# Patient Record
Sex: FEMALE | Race: WHITE
[De-identification: names, ages, dates, MRNs, and addresses within clinical notes are randomized per-mention and may not be internally consistent; named-entity substitution may affect disease eponyms.]

---

## 2018-01-27 ENCOUNTER — HOSPITAL ENCOUNTER (INPATIENT)
Dept: HOSPITAL 75 - ER | Age: 53
LOS: 1 days | Discharge: HOME | DRG: 440 | End: 2018-01-28
Attending: INTERNAL MEDICINE | Admitting: INTERNAL MEDICINE
Payer: SELF-PAY

## 2018-01-27 VITALS — SYSTOLIC BLOOD PRESSURE: 182 MMHG | DIASTOLIC BLOOD PRESSURE: 86 MMHG

## 2018-01-27 VITALS — DIASTOLIC BLOOD PRESSURE: 93 MMHG | SYSTOLIC BLOOD PRESSURE: 171 MMHG

## 2018-01-27 VITALS — SYSTOLIC BLOOD PRESSURE: 176 MMHG | DIASTOLIC BLOOD PRESSURE: 81 MMHG

## 2018-01-27 VITALS — DIASTOLIC BLOOD PRESSURE: 85 MMHG | SYSTOLIC BLOOD PRESSURE: 143 MMHG

## 2018-01-27 VITALS — HEIGHT: 67 IN | BODY MASS INDEX: 24.88 KG/M2 | WEIGHT: 158.5 LBS

## 2018-01-27 DIAGNOSIS — F17.210: ICD-10-CM

## 2018-01-27 DIAGNOSIS — E11.9: ICD-10-CM

## 2018-01-27 DIAGNOSIS — E78.00: ICD-10-CM

## 2018-01-27 DIAGNOSIS — I10: ICD-10-CM

## 2018-01-27 DIAGNOSIS — K85.00: Primary | ICD-10-CM

## 2018-01-27 LAB
ALBUMIN SERPL-MCNC: 4.1 GM/DL (ref 3.2–4.5)
ALP SERPL-CCNC: 132 U/L (ref 40–136)
ALT SERPL-CCNC: 54 U/L (ref 0–55)
APTT PPP: YELLOW S
BACTERIA #/AREA URNS HPF: (no result) /HPF
BASOPHILS # BLD AUTO: 0 10^3/UL (ref 0–0.1)
BASOPHILS NFR BLD AUTO: 1 % (ref 0–10)
BILIRUB SERPL-MCNC: 0.2 MG/DL (ref 0.1–1)
BILIRUB UR QL STRIP: NEGATIVE
BUN/CREAT SERPL: 14
CALCIUM SERPL-MCNC: 9.4 MG/DL (ref 8.5–10.1)
CHLORIDE SERPL-SCNC: 104 MMOL/L (ref 98–107)
CO2 SERPL-SCNC: 23 MMOL/L (ref 21–32)
CREAT SERPL-MCNC: 0.84 MG/DL (ref 0.6–1.3)
EOSINOPHIL # BLD AUTO: 0.2 10^3/UL (ref 0–0.3)
EOSINOPHIL NFR BLD AUTO: 3 % (ref 0–10)
ERYTHROCYTE [DISTWIDTH] IN BLOOD BY AUTOMATED COUNT: 13.2 % (ref 10–14.5)
FIBRINOGEN PPP-MCNC: CLEAR MG/DL
GFR SERPLBLD BASED ON 1.73 SQ M-ARVRAT: > 60 ML/MIN
GLUCOSE SERPL-MCNC: 181 MG/DL (ref 70–105)
GLUCOSE UR STRIP-MCNC: NEGATIVE MG/DL
HCT VFR BLD CALC: 41 % (ref 35–52)
HGB BLD-MCNC: 14.3 G/DL (ref 11.5–16)
KETONES UR QL STRIP: NEGATIVE
LEUKOCYTE ESTERASE UR QL STRIP: (no result)
LIPASE SERPL-CCNC: 159 U/L (ref 8–78)
LYMPHOCYTES # BLD AUTO: 2.8 X 10^3 (ref 1–4)
LYMPHOCYTES NFR BLD AUTO: 34 % (ref 12–44)
MAGNESIUM SERPL-MCNC: 2.2 MG/DL (ref 1.8–2.4)
MANUAL DIFFERENTIAL PERFORMED BLD QL: NO
MCH RBC QN AUTO: 34 PG (ref 25–34)
MCHC RBC AUTO-ENTMCNC: 35 G/DL (ref 32–36)
MCV RBC AUTO: 98 FL (ref 80–99)
MONOCYTES # BLD AUTO: 0.7 X 10^3 (ref 0–1)
MONOCYTES NFR BLD AUTO: 9 % (ref 0–12)
NEUTROPHILS # BLD AUTO: 4.5 X 10^3 (ref 1.8–7.8)
NEUTROPHILS NFR BLD AUTO: 54 % (ref 42–75)
NITRITE UR QL STRIP: NEGATIVE
PH UR STRIP: 7 [PH] (ref 5–9)
PLATELET # BLD: 312 10^3/UL (ref 130–400)
PMV BLD AUTO: 10.8 FL (ref 7.4–10.4)
POTASSIUM SERPL-SCNC: 4.2 MMOL/L (ref 3.6–5)
PROT SERPL-MCNC: 7.5 GM/DL (ref 6.4–8.2)
PROT UR QL STRIP: NEGATIVE
RBC # BLD AUTO: 4.22 10^6/UL (ref 4.35–5.85)
RBC #/AREA URNS HPF: (no result) /HPF
SODIUM SERPL-SCNC: 139 MMOL/L (ref 135–145)
SP GR UR STRIP: 1 (ref 1.02–1.02)
TRIGL SERPL-MCNC: 314 MG/DL (ref ?–150)
UROBILINOGEN UR-MCNC: NORMAL MG/DL
WBC # BLD AUTO: 8.3 10^3/UL (ref 4.3–11)
WBC #/AREA URNS HPF: (no result) /HPF

## 2018-01-27 PROCEDURE — 36415 COLL VENOUS BLD VENIPUNCTURE: CPT

## 2018-01-27 PROCEDURE — 85025 COMPLETE CBC W/AUTO DIFF WBC: CPT

## 2018-01-27 PROCEDURE — 81000 URINALYSIS NONAUTO W/SCOPE: CPT

## 2018-01-27 PROCEDURE — 84478 ASSAY OF TRIGLYCERIDES: CPT

## 2018-01-27 PROCEDURE — 83036 HEMOGLOBIN GLYCOSYLATED A1C: CPT

## 2018-01-27 PROCEDURE — 83605 ASSAY OF LACTIC ACID: CPT

## 2018-01-27 PROCEDURE — 83690 ASSAY OF LIPASE: CPT

## 2018-01-27 PROCEDURE — 80320 DRUG SCREEN QUANTALCOHOLS: CPT

## 2018-01-27 PROCEDURE — 83735 ASSAY OF MAGNESIUM: CPT

## 2018-01-27 PROCEDURE — 80053 COMPREHEN METABOLIC PANEL: CPT

## 2018-01-27 RX ADMIN — FENTANYL CITRATE PRN MCG: 50 INJECTION, SOLUTION INTRAMUSCULAR; INTRAVENOUS at 23:53

## 2018-01-27 RX ADMIN — FENTANYL CITRATE PRN MCG: 50 INJECTION, SOLUTION INTRAMUSCULAR; INTRAVENOUS at 19:46

## 2018-01-27 RX ADMIN — SODIUM CHLORIDE AND POTASSIUM CHLORIDE SCH MLS/HR: 4.5; 1.49 INJECTION, SOLUTION INTRAVENOUS at 21:54

## 2018-01-27 RX ADMIN — FENTANYL CITRATE PRN MCG: 50 INJECTION, SOLUTION INTRAMUSCULAR; INTRAVENOUS at 21:54

## 2018-01-27 RX ADMIN — ONDANSETRON PRN MG: 2 INJECTION, SOLUTION INTRAMUSCULAR; INTRAVENOUS at 13:36

## 2018-01-27 RX ADMIN — SODIUM CHLORIDE AND POTASSIUM CHLORIDE SCH MLS/HR: 4.5; 1.49 INJECTION, SOLUTION INTRAVENOUS at 08:22

## 2018-01-27 RX ADMIN — SODIUM CHLORIDE AND POTASSIUM CHLORIDE SCH MLS/HR: 4.5; 1.49 INJECTION, SOLUTION INTRAVENOUS at 13:36

## 2018-01-27 RX ADMIN — ONDANSETRON PRN MG: 2 INJECTION, SOLUTION INTRAMUSCULAR; INTRAVENOUS at 21:54

## 2018-01-27 RX ADMIN — FENTANYL CITRATE PRN MCG: 50 INJECTION, SOLUTION INTRAMUSCULAR; INTRAVENOUS at 13:36

## 2018-01-27 RX ADMIN — ONDANSETRON PRN MG: 2 INJECTION, SOLUTION INTRAMUSCULAR; INTRAVENOUS at 08:22

## 2018-01-27 RX ADMIN — FENTANYL CITRATE PRN MCG: 50 INJECTION, SOLUTION INTRAMUSCULAR; INTRAVENOUS at 15:47

## 2018-01-27 RX ADMIN — FENTANYL CITRATE PRN MCG: 50 INJECTION, SOLUTION INTRAMUSCULAR; INTRAVENOUS at 17:39

## 2018-01-27 RX ADMIN — FENTANYL CITRATE PRN MCG: 50 INJECTION, SOLUTION INTRAMUSCULAR; INTRAVENOUS at 11:39

## 2018-01-27 RX ADMIN — FENTANYL CITRATE PRN MCG: 50 INJECTION, SOLUTION INTRAMUSCULAR; INTRAVENOUS at 09:40

## 2018-01-27 RX ADMIN — ONDANSETRON PRN MG: 2 INJECTION, SOLUTION INTRAMUSCULAR; INTRAVENOUS at 17:38

## 2018-01-27 NOTE — ED ABDOMINAL PAIN
General


Chief Complaint:  Abdominal/GI Problems


Stated Complaint:  ABD PAIN


Nursing Triage Note:  


PT REPORTS SHE WAS SEEN AND TX AT [facility] TWICE ON 1/25/18 AND WAS 


ADMITTED THE SECOND TIME FOR PANCREATITIS. SHE REPORTS SHE LEFT AMA THE MORNING 


OF 1/26/18. SHE REPORTS WORSENING OF PAIN THROUGHOUT THE DAY TODAY ACCOMPANIED 


BY NAUSEA.


Sepsis Screen:  No Definite Risk


Source of Information:  Patient


Exam Limitations:  No Limitations





History of Present Illness


Date Seen by Provider:  Jan 27, 2018


Time Seen by Provider:  03:29


Initial Comments


Patient presents a significant other and a chief complaint that for the past 

couple weeks she's had some intermittent upper epigastric abdominal pain and 

nausea especially stressed bowel movement but does not feel she has to go. Last 

4 days the pain is been constant progressively worsening severe sharp stabbing 

in her epigastric region. She has a history of pancreatitis she felt this might 

be the case and she went to the ER at [place] 2 days ago but they did not 

appreciate that her lipase was elevated very much and thought maybe she was 

constipated after CT scan so they sent her home with some mag citrate. She 

drink half a bottle and use some enemas couldn't pass anything but water. She 

says she has been eating or drinking much so she didn't feel like she was 

constipated. She went back yesterday to the same ER and this time her lipase 

was elevated significantly and they felt that she likely had pancreatitis and 

admitted her. She was getting pain medicines and fluids but was dissatisfied 

that they were not doing any more labs or imaging on her so she left AMA and 

decided to come down here to be seen at this ER tonight. She is hopeful to 

establish care at the local physician's office. She is not having any fevers or 

chills cough, shortness of breath, chest pain. This is her typical pain that 

she says she experiences with pancreatitis. She says she has not had a drink in 

months. She says she's been trying to force herself to eat and drink but every 

time she does it makes her pain worse and she gets sick to her stomach. She 

denies any dysuria or discharge.





Allergies and Home Medications


Allergies


Coded Allergies:  


     No Known Drug Allergies (Unverified , 1/27/18)





Review of Systems


Constitutional:  No chills, No fever


EENTM:  No Blurred Vision, No Double Vision


Respiratory:  Denies Cough, Denies Shortness of Air


Cardiovascular:  Denies Chest Pain


Gastrointestinal:  See HPI, Denies Abdomen Distended, Abdominal Pain, Denies 

Constipated, Denies Diarrhea, Nausea, Denies Vomiting


Genitourinary:  Denies Burning, Denies Discharge





Past Medical-Social-Family Hx


Patient Social History


Alcohol Use:  Past History


Recreational Drug Use:  No


Smoking Status:  Current Everyday Smoker


Type Used:  Cigarettes


2nd Hand Smoke Exposure:  Yes


Recent Foreign Travel:  No


Contact w/Someone Who Travel:  No


Recent Infectious Disease Expo:  No


Recent Hopitalizations:  Yes (SHAMIKA GALVAN 1/26)





Seasonal Allergies


Seasonal Allergies:  No





Surgeries


History of Surgeries:  Yes (HEART CATH)


Surgeries:  Gallbladder, Orthopedic, Tubal Ligation





Respiratory


History of Respiratory Disorde:  No





Cardiovascular


History of Cardiac Disorders:  Yes


Cardiac Disorders:  Heart Attack, High Cholesterol, Hypertension





Neurological


History of Neurological Disord:  No





Genitourinary


History of Genitourinary Disor:  No





Gastrointestinal


History of Gastrointestinal Di:  Yes


Gastrointestinal Disorders:  Pancreatitis





Musculoskeletal


History of Musculoskeletal Dis:  No





Endocrine


History of Endocrine Disorders:  Yes


Endocrine Disorders:  Hyperthyroidism, Diabetes, Non-Insulin dep





HEENT


History of HEENT Disorders:  No





Cancer


History of Cancer:  No





Psychosocial


History of Psychiatric Problem:  No





Integumentary


History of Skin or Integumenta:  No





Physical Exam


Vital Signs





 VS - Last 72 Hours, by Label








 1/27/18





 02:45


 


Temp 98.9


 


Pulse 94


 


Resp 18


 


B/P (MAP) 166/105 (125)


 


Pulse Ox 96


 


O2 Delivery Room Air





Capillary Refill : Less Than 3 Seconds


General Appearance:  WD/WN, moderate distress


HEENT:  PERRL/EOMI, pharynx normal


Neck:  non-tender, full range of motion, supple, normal inspection


Respiratory:  chest non-tender, lungs clear, normal breath sounds, no 

respiratory distress, no accessory muscle use


Cardiovascular:  normal peripheral pulses, regular rate, rhythm, no edema


Peripheral Pulses:  2+ Radial Pulses (R), 2+ Radial Pulses (L)


Gastrointestinal:  normal bowel sounds, guarding, No rebound, tenderness (

especially in the epigastric region; negative for Andrews sign.)





Focused Exam


Evaluation


Lactate Level


Laboratory Tests


1/27/18 03:55: Lactic Acid Level 0.94





Lactic Acid Level





Laboratory Tests








Test


  1/27/18


03:55


 


Lactic Acid Level


  0.94 MMOL/L


(0.50-2.00)











Progress/Results/Core Measures


Results/Orders


Lab Results





Laboratory Tests








Test


  1/27/18


03:35 1/27/18


03:50 1/27/18


03:55 Range/Units


 


 


White Blood Count


  8.3 


  


  


  4.3-11.0


10^3/uL


 


Red Blood Count


  4.22 L


  


  


  4.35-5.85


10^6/uL


 


Hemoglobin 14.3    11.5-16.0  G/DL


 


Hematocrit 41    35-52  %


 


Mean Corpuscular Volume 98    80-99  FL


 


Mean Corpuscular Hemoglobin 34    25-34  PG


 


Mean Corpuscular Hemoglobin


Concent 35 


  


  


  32-36  G/DL


 


 


Red Cell Distribution Width 13.2    10.0-14.5  %


 


Platelet Count


  312 


  


  


  130-400


10^3/uL


 


Mean Platelet Volume 10.8 H   7.4-10.4  FL


 


Neutrophils (%) (Auto) 54    42-75  %


 


Lymphocytes (%) (Auto) 34    12-44  %


 


Monocytes (%) (Auto) 9    0-12  %


 


Eosinophils (%) (Auto) 3    0-10  %


 


Basophils (%) (Auto) 1    0-10  %


 


Neutrophils # (Auto) 4.5    1.8-7.8  X 10^3


 


Lymphocytes # (Auto) 2.8    1.0-4.0  X 10^3


 


Monocytes # (Auto) 0.7    0.0-1.0  X 10^3


 


Eosinophils # (Auto)


  0.2 


  


  


  0.0-0.3


10^3/uL


 


Basophils # (Auto)


  0.0 


  


  


  0.0-0.1


10^3/uL


 


Sodium Level 139    135-145  MMOL/L


 


Potassium Level 4.2    3.6-5.0  MMOL/L


 


Chloride Level 104      MMOL/L


 


Carbon Dioxide Level 23    21-32  MMOL/L


 


Anion Gap 12    5-14  MMOL/L


 


Blood Urea Nitrogen 12    7-18  MG/DL


 


Creatinine


  0.84 


  


  


  0.60-1.30


MG/DL


 


Estimat Glomerular Filtration


Rate > 60 


  


  


   


 


 


BUN/Creatinine Ratio 14     


 


Glucose Level 181 H     MG/DL


 


Calcium Level 9.4    8.5-10.1  MG/DL


 


Magnesium Level 2.2    1.8-2.4  MG/DL


 


Total Bilirubin 0.2    0.1-1.0  MG/DL


 


Aspartate Amino Transf


(AST/SGOT) 31 


  


  


  5-34  U/L


 


 


Alanine Aminotransferase


(ALT/SGPT) 54 


  


  


  0-55  U/L


 


 


Alkaline Phosphatase 132      U/L


 


Total Protein 7.5    6.4-8.2  GM/DL


 


Albumin 4.1    3.2-4.5  GM/DL


 


Triglycerides Level 314 H   <150  MG/DL


 


Lipase 159 H   8-78  U/L


 


Serum Alcohol < 10    <10  MG/DL


 


Urine Color  YELLOW    


 


Urine Clarity  CLEAR    


 


Urine pH  7   5-9  


 


Urine Specific Gravity  1.005 L  1.016-1.022  


 


Urine Protein  NEGATIVE   NEGATIVE  


 


Urine Glucose (UA)  NEGATIVE   NEGATIVE  


 


Urine Ketones  NEGATIVE   NEGATIVE  


 


Urine Nitrite  NEGATIVE   NEGATIVE  


 


Urine Bilirubin  NEGATIVE   NEGATIVE  


 


Urine Urobilinogen  NORMAL   NORMAL  MG/DL


 


Urine Leukocyte Esterase  1+ H  NEGATIVE  


 


Urine RBC (Auto)  NEGATIVE   NEGATIVE  


 


Urine RBC  NONE    /HPF


 


Urine WBC  RARE    /HPF


 


Urine Squamous Epithelial


Cells 


  10-25 H


  


   /HPF


 


 


Urine Crystals  NONE    /LPF


 


Urine Bacteria  TRACE    /HPF


 


Urine Casts  NONE    /LPF


 


Urine Mucus  NEGATIVE    /LPF


 


Urine Culture Indicated  NO    


 


Lactic Acid Level


  


  


  0.94 


  0.50-2.00


MMOL/L








My Orders





Orders - LUL KEY


Alcohol (1/27/18 03:32)


Cbc With Automated Diff (1/27/18 03:32)


Comprehensive Metabolic Panel (1/27/18 03:32)


Lactic Acid Analyzer (1/27/18 03:32)


Lipase (1/27/18 03:32)


Magnesium (1/27/18 03:32)


Ua Culture If Indicated (1/27/18 03:32)


Saline Lock/Iv-Start (1/27/18 03:32)


Lactated Ringers (Lr 1000 Ml Iv Solution (1/27/18 03:32)


Fentanyl  Injection (Sublimaze Injection (1/27/18 03:45)


Ondansetron Injection (Zofran Injectio (1/27/18 03:45)


Lactated Ringers (Lr 1000 Ml Iv Solution (1/27/18 03:32)


Triglycerides (1/27/18 03:32)


Hemoglobin A1c (1/27/18 03:41)


Fentanyl  Injection (Sublimaze Injection (1/27/18 05:30)


Ondansetron Injection (Zofran Injectio (1/27/18 05:30)





Medications Given in ED





Current Medications








 Medications  Dose


 Ordered  Sig/Lauryn


 Route  Start Time


 Stop Time Status Last Admin


Dose Admin


 


 Fentanyl Citrate  50 mcg  ONCE  ONCE


 IVP  1/27/18 03:45


 1/27/18 03:46 DC 1/27/18 03:55


50 MCG


 


 Lactated Ringer's  1,000 ml @ 


 0 mls/hr  Q0M ONCE


 IV  1/27/18 03:32


 1/27/18 03:36 DC 1/27/18 03:55


0 MLS/HR


 


 Ondansetron HCl  4 mg  ONCE  ONCE


 IVP  1/27/18 03:45


 1/27/18 03:46 DC 1/27/18 03:55


4 MG








Vital Signs/I&O





Vital Sign - Last 12Hours








 1/27/18





 02:45


 


Temp 98.9


 


Pulse 94


 


Resp 18


 


B/P (MAP) 166/105 (125)


 


Pulse Ox 96


 


O2 Delivery Room Air














Blood Pressure Mean:  125








Progress Note :  


   Time:  03:40


Progress Note


Undertreated pancreatitis seems to be the case. Were going to get a lipase 

alcohol level and triglycerides as well as an A1c. Differential includes DKA so 

she is diabetic and she's been off of her medicines for several months. Try to 

try and obtain labs and CT report from yesterday from [place]





Departure


Communication (Admissions)


Time/Spoke to Admitting Phy:  05:38


Communication


Discussed case lab imaging and findings with Dr. Daigle and he is okay putting 

the patient and agrees with fluids and pain medicine.





Impression


Impression:  


 Primary Impression:  


 Pancreatitis, acute


 Qualified Codes:  K85.90 - Acute pancreatitis without necrosis or infection, 

unspecified


Disposition:  09 ADMITTED AS INPATIENT


Condition:  Stable





Admissions


Decision to Admit Reason:  Admit from ER (General)


Decision to Admit/Date:  Jan 27, 2018


Time/Decision to Admit Time:  05:43





Departure-Patient Inst.


Referrals:  


NO,LOCAL PHYSICIAN (PCP/Family)


Primary Care Physician











LUL KEY Jan 27, 2018 03:41

## 2018-01-28 VITALS — SYSTOLIC BLOOD PRESSURE: 136 MMHG | DIASTOLIC BLOOD PRESSURE: 61 MMHG

## 2018-01-28 VITALS — SYSTOLIC BLOOD PRESSURE: 137 MMHG | DIASTOLIC BLOOD PRESSURE: 66 MMHG

## 2018-01-28 VITALS — SYSTOLIC BLOOD PRESSURE: 140 MMHG | DIASTOLIC BLOOD PRESSURE: 93 MMHG

## 2018-01-28 VITALS — SYSTOLIC BLOOD PRESSURE: 132 MMHG | DIASTOLIC BLOOD PRESSURE: 59 MMHG

## 2018-01-28 LAB
ALBUMIN SERPL-MCNC: 3.5 GM/DL (ref 3.2–4.5)
ALP SERPL-CCNC: 108 U/L (ref 40–136)
ALT SERPL-CCNC: 47 U/L (ref 0–55)
BILIRUB SERPL-MCNC: 0.4 MG/DL (ref 0.1–1)
BUN/CREAT SERPL: 7
CALCIUM SERPL-MCNC: 9.1 MG/DL (ref 8.5–10.1)
CHLORIDE SERPL-SCNC: 109 MMOL/L (ref 98–107)
CO2 SERPL-SCNC: 20 MMOL/L (ref 21–32)
CREAT SERPL-MCNC: 0.87 MG/DL (ref 0.6–1.3)
GFR SERPLBLD BASED ON 1.73 SQ M-ARVRAT: > 60 ML/MIN
GLUCOSE SERPL-MCNC: 149 MG/DL (ref 70–105)
LIPASE SERPL-CCNC: 59 U/L (ref 8–78)
POTASSIUM SERPL-SCNC: 4.8 MMOL/L (ref 3.6–5)
PROT SERPL-MCNC: 6.3 GM/DL (ref 6.4–8.2)
SODIUM SERPL-SCNC: 138 MMOL/L (ref 135–145)

## 2018-01-28 RX ADMIN — FENTANYL CITRATE PRN MCG: 50 INJECTION, SOLUTION INTRAMUSCULAR; INTRAVENOUS at 09:14

## 2018-01-28 RX ADMIN — FENTANYL CITRATE PRN MCG: 50 INJECTION, SOLUTION INTRAMUSCULAR; INTRAVENOUS at 04:47

## 2018-01-28 RX ADMIN — ONDANSETRON PRN MG: 2 INJECTION, SOLUTION INTRAMUSCULAR; INTRAVENOUS at 04:47

## 2018-01-28 RX ADMIN — ONDANSETRON PRN MG: 2 INJECTION, SOLUTION INTRAMUSCULAR; INTRAVENOUS at 09:14

## 2018-01-28 RX ADMIN — SODIUM CHLORIDE AND POTASSIUM CHLORIDE SCH MLS/HR: 4.5; 1.49 INJECTION, SOLUTION INTRAVENOUS at 06:51

## 2018-01-28 RX ADMIN — FENTANYL CITRATE PRN MCG: 50 INJECTION, SOLUTION INTRAMUSCULAR; INTRAVENOUS at 02:38

## 2018-01-28 RX ADMIN — FENTANYL CITRATE PRN MCG: 50 INJECTION, SOLUTION INTRAMUSCULAR; INTRAVENOUS at 06:51

## 2018-01-28 RX ADMIN — SODIUM CHLORIDE AND POTASSIUM CHLORIDE SCH MLS/HR: 4.5; 1.49 INJECTION, SOLUTION INTRAVENOUS at 01:27

## 2018-01-28 NOTE — XMS REPORT
Continuity of Care Document

 Created on: 2018



SHAN BENAVIDES

External Reference #: [id number]

: 1965

Sex: Female



Demographics







 Preferred Language  Unknown

 

 Marital Status  Unknown

 

 Jew Affiliation  Unknown

 

 Race  Unknown

 

 Ethnic Group  Unknown





Author







 Author  [facility]

 

 Address  Unknown

 

 Phone  Unavailable



              



Allergies

      



There is no data.                  



Medications

      



There is no data.                  



Problems

      





 Date Dx Coded            Attending            Type            Code            
Diagnosis            Diagnosed By        

 

 2012                                      300.00            ANXIETY 
DISORDER NOS                     

 

 2012                                      723.1            neck pain    
                 



                    



Procedures

      



There is no data.                  



Results

      



There is no data.              



Encounters

      





 ACCT No.            Visit Date/Time            Discharge            Status    
        Pt. Type            Provider            Facility            Loc./Unit  
          Complaint        

 

 [id number]            2012 07:56:00            2012 23:59:59          
  [facility]            Outpatient

## 2018-01-28 NOTE — XMS REPORT
[facility]

 Created on: 10/22/2016



Beatrice Ricks

External Reference #: [id number]

: 1965

Sex: Female



Demographics







 Address  [address] [place]  [address]

 

 Home Phone  [phone]

 

 Preferred Language  Unknown

 

 Marital Status  Unknown

 

 Latter day Affiliation  Unknown

 

 Race  White

 

 Ethnic Group  Not  or 





Author







 Author  ANIRUDH HARRIS

 

 Organization  [employer]

 

 Address  Unknown

 

 Phone  Unavailable







Care Team Providers







 Care Team Member Name  Role  Phone

 

 ANIRUDH HARRIS  CP  Unavailable



                                                                



Allergies, Adverse Reactions, Alerts

          





 Substance  Reaction  Event Type

 

 N.K.D.A.  Info Not Available  Non Drug Allergy



                                                                               
         



Problems

          





 Problem Type  Condition  Code  Onset Dates  Condition Status

 

 Problem  Chronic hepatitis C without hepatic coma  B18.2     Active

 

 Problem  Hepatitis C  B19.20     Active

 

 Problem  Other chronic pancreatitis  K86.1     Active

 

 Assessment  Epigastric pain  R10.13     Active

 

 Problem  Anxiety state, unspecified  300.00     Active

 

 Problem  Cervicalgia  723.1     Active



                                                                               
                                                           



Medications

          





 Medication  Code System  Code  Instructions  Start Date  End Date  Status  
Dosage

 

 Pantoprazole Sodium  NDC  [address]-34  40 mg Orally Once a day  Oct 03, 2016
        1 tablet

 

 PredniSONE  NDC  [address]-59  20 mg Orally 2 times a day  Oct 03, 2016      
  1 tablet with food

 

 Percocet  NDC  [address]-66  5-325 MG Orally every 12 hours for one week then 
daily then stop  Oct 11, 2016        1 tablet as needed

 

 Ondansetron HCl  NDC  [address]-29  8 MG Orally every 12 hours prn nausea    
       Take 1 tablet



                                                                               
                                       



Procedures

          





 Procedure  Coding System  Code  Date

 

 Office Visit, Est Pt., Level 3  CPT-4  94071  Oct 17, 2016



                                                                               
                   



Vital Signs

          





 Date/Time:  Oct 17, 2016

 

 Cardiac Monitoring Heart Rate  82 bpm

 

 Weight  130.2 lbs

 

 Height  67 in

 

 BMI  20.39 Index

 

 Blood Pressure Diastolic  74 mmHg

 

 Blood Pressure Systolic  148 mmHg



                                                                              



Results

          No Known Results                                                     
               



Summary Purpose

          [employer] Submission

## 2018-01-28 NOTE — XMS REPORT
[facility]

 Created on: 2017



Beatrice Ricks

External Reference #: [id number]

: 1965

Sex: Female



Demographics







 Address  [address] [place]  [address]

 

 Preferred Language  Unknown

 

 Marital Status  Unknown

 

 Muslim Affiliation  Unknown

 

 Race  Unknown

 

 Ethnic Group  Unknown





Author







 Author  ANIRUDH HARRIS

 

 [facility]

 

 Address  [address] [place]  [address]



 

 Phone  [phone]







Care Team Providers







 Care Team Member Name  Role  Phone

 

 ANIRUDH HARRIS  Unavailable  [phone]







PROBLEMS







 Type  Condition  ICD9-CM Code  QRS13-OC Code  Onset Dates  Condition Status  
SNOMED Code

 

 Problem  Cervicalgia  723.1        Active  65352724

 

 Problem  Pulmonary nodule, left     R91.1     Active  984798576

 

 Problem  Type 2 diabetes mellitus without complication, without long-term 
current use of insulin     E11.9     Active  113803479

 

 Problem  Hepatitis C     B19.20     Active  68017838

 

 Problem  Anxiety state, unspecified  300.00        Active  116556515

 

 Problem  Other chronic pancreatitis     K86.1     Active  390429784

 

 Problem  Chronic hepatitis C without hepatic coma     B18.2     Active  
[id number]







ALLERGIES

Unknown Allergies



SOCIAL HISTORY

No smoking Hx information available



PLAN OF CARE





VITAL SIGNS





MEDICATIONS

Unknown Medications



RESULTS

No Results



PROCEDURES

No Known procedures



IMMUNIZATIONS

No Known Immunizations

## 2018-01-28 NOTE — DISCHARGE SUMMARY-HOSPITALIST
Diagnosis/Chief Complaint


Date of Admission


Jan 27, 2018 at 05:40


Date of Discharge





Discharge Date:  Jan 28, 2018


Admission Diagnosis


1.  Recurrent acute pancreatitis possibly idiopathic but in need of further 

workup.  We'll place of bowel rest will initiate proton pump inhibitor therapy 

considering other GI symptoms with parenteral pain medication as needed.  Upon 

discharge will set up follow up with [facility] with subsequent likely GI 

referral to follow.





Discharge Diagnosis


as per above








Discharge Summary


Discharge Physical Examination


Allergies:  


Coded Allergies:  


     No Known Drug Allergies (Unverified , 1/27/18)


Vitals & I&Os





Vital Signs








  Date Time  Temp Pulse Resp B/P (MAP) Pulse Ox O2 Delivery O2 Flow Rate FiO2


 


1/28/18 09:00      Room Air  


 


1/28/18 08:08 97.7 64 18 140/93 (109) 98   











Hospital Course


the patient is a 52-year-old white female with a past history of reported 

pancreatitis who left [facility] and presented to our emergency 

room with complaints of abdominal pain.  Her lipase level was mildly elevated 

at 159 and she was admitted for pain management.  The following day her lipase 

dropped to 59 and liver function tests remain normal.  CT scan from [place] 

revealed no evidence for pancreatic necrosis or pseudocyst formation.  There is 

no evidence for biliary obstruction.  As I recall the patient a past history of 

cholecystectomy.  She has had some binge drinking episodes in the past but it 

is been several years since she reports any heavy alcohol consumption.  

Discussed the importance of remaining abstinent from alcohol and advised a low-

fat diet.  Discussed concerns over narcotic addiction considering her past 

history of addiction to cocaine so this provider was not willing to prescribe 

narcotics.  She was given a prescription for tramadol 50 mg every 6 hours 

number 30 with no refills.  We discussed the fact that she needed further 

workup consideration for ERCP and need for GI referral. reportedly she had a 

clash with her primary care physician Dr. Smith wanting an upper GI with small 

bowel follow-through.  As she was passing gas and had no evidence for 

obstruction I told her that I concurred with her primary care physician and 

there was no need to put her through this procedure at this time.  Her ileus 

resolved with some resultant loose stools with decreased abdominal pain and no 

evidence  or melena or hematochezia.  She was given [facility] phone 

number and advised if she wasn't comfortable seeing her primary care provider 

considering the above circumstances that she can go there for primary care and 

they could set her up with GI referral.


Labs (last 24 hrs)


Laboratory Tests


1/28/18 05:34: 


Sodium Level 138, Potassium Level 4.8, Chloride Level 109H, Carbon Dioxide 

Level 20L, Anion Gap 9, Blood Urea Nitrogen 6L, Creatinine 0.87, Estimat 

Glomerular Filtration Rate > 60, BUN/Creatinine Ratio 7, Glucose Level 149H, 

Calcium Level 9.1, Total Bilirubin 0.4, Aspartate Amino Transf (AST/SGOT) 34, 

Alanine Aminotransferase (ALT/SGPT) 47, Alkaline Phosphatase 108, Total Protein 

6.3L, Albumin 3.5, Lipase 59





Pending Labs


Laboratory Tests


1/28/18 05:34: 


Sodium Level 138, Potassium Level 4.8, Chloride Level 109, Carbon Dioxide Level 

20, Anion Gap 9, Blood Urea Nitrogen 6, Creatinine 0.87, Estimat Glomerular 

Filtration Rate > 60, BUN/Creatinine Ratio 7, Glucose Level 149, Calcium Level 

9.1, Total Bilirubin 0.4, Aspartate Amino Transf (AST/SGOT) 34, Alanine 

Aminotransferase (ALT/SGPT) 47, Alkaline Phosphatase 108, Total Protein 6.3, 

Albumin 3.5, Lipase 59








Discharge


Home Medications:





Active Scripts


Active


Tramadol HCl 50 Mg Tablet 50 Mg PO QID PRN 10 Days





Instructions to patient/family


Please see electronic discharge instructions given to patient.





Clinical Quality Measures


DVT/VTE Risk/Contraindication:


Risk Factor Score Per Nursing:  3


RFS Level Per Nursing on Admit:  3=High











KAYLEEN BAIN MD Jan 28, 2018 11:57

## 2018-01-28 NOTE — XMS REPORT
[facility]

 Created on: 10/11/2016



Beatrice Ricks

External Reference #: [id number]

: 1965

Sex: Female



Demographics







 Address  [address] [place]  [address]

 

 Home Phone  [phone]

 

 Preferred Language  Unknown

 

 Marital Status  Unknown

 

 Synagogue Affiliation  Unknown

 

 Race  White

 

 Ethnic Group  Not  or 





Author







 ANIRUDH Vazquez

 

 Organization  [employer]

 

 Address  Unknown

 

 Phone  Unavailable







Care Team Providers







 Care Team Member Name  Role  Phone

 

 ANIRUDH HARRIS  CP  Unavailable



                                                                



Allergies, Adverse Reactions, Alerts

          





 Substance  Reaction  Event Type

 

 N.K.D.A.  Info Not Available  Non Drug Allergy



                                                                               
         



Problems

          





 Problem Type  Condition  Code  Onset Dates  Condition Status

 

 Problem  Anxiety state, unspecified  300.00     Active

 

 Problem  Cervicalgia  723.1     Active

 

 Problem  Hepatitis C  B19.20     Active

 

 Assessment  Epigastric abdominal pain  R10.13     Active



                                                                               
                                       



Medications

          





 Medication  Code System  Code  Instructions  Start Date  End Date  Status  
Dosage

 

 Ondansetron HCl  NDC  [address]-98  8 MG Orally every 4-6 hours prn nausea/
vomiting           Take 1 tablet

 

 PredniSONE  NDC  [address]-28  20 mg Orally Once a day  Oct 03, 2016        2 
tablets with food or milk for 4 days, then take one tablet for 4 days then 1/2 
tablet for 4 days then stop

 

 Pantoprazole Sodium  NDC  [address]-15  40 mg Orally Once a day  Oct 03, 2016
        1 tablet

 

 Percocet  NDC  [address]-38  5-325 mg    Oct 01, 2016        take 1 tablet by 
oral route every 6 hours as needed



                                                                               
                                       



Procedures

          





 Procedure  Coding System  Code  Date

 

 Office Visit, New Pt., Level 3  CPT-4  97425  Oct 03, 2016

 

 RBC SED RATE, AUTOMATED  CPT-4  89525  Oct 03, 2016



                                                                               
                             



Vital Signs

          





 Date/Time:  Oct 03, 2016

 

 Cardiac Monitoring Heart Rate  96 bpm

 

 Weight  133.4 lbs

 

 Height  67 in

 

 BMI  20.89 Index

 

 Blood Pressure Diastolic  90 mmHg

 

 Blood Pressure Systolic  150 mmHg



                                                                              



Results

          No Known Results                                                     
               



Summary Purpose

          [employer] Submission

## 2018-01-28 NOTE — XMS REPORT
[facility]

 Created on: 10/31/2016



Beatrice Ricks

External Reference #: [id number]

: 1965

Sex: Female



Demographics







 Address  [address] [place]  [address]

 

 Home Phone  [phone]

 

 Preferred Language  Unknown

 

 Marital Status  Unknown

 

 Jainism Affiliation  Unknown

 

 Race  White

 

 Ethnic Group  Not  or 





Author







 ANIRUDH Vazquez

 

 [facility]  [employer]

 

 Address  Unknown

 

 Phone  Unavailable







Care Team Providers







 Care Team Member Name  Role  Phone

 

 ANIRUDH HARRIS  CP  Unavailable



                                                                



Allergies, Adverse Reactions, Alerts

          





 Substance  Reaction  Event Type

 

 N.K.D.A.  Info Not Available  Non Drug Allergy



                                                                               
         



Problems

          





 Problem Type  Condition  Code  Onset Dates  Condition Status

 

 Assessment  Type 2 diabetes mellitus without complication, without long-term 
current use of insulin  E11.9     Active

 

 Assessment  Other chronic pancreatitis  K86.1     Active

 

 Assessment  Chronic hepatitis C without hepatic coma  B18.2     Active

 

 Problem  Other chronic pancreatitis  K86.1     Active

 

 Problem  Chronic hepatitis C without hepatic coma  B18.2     Active

 

 Problem  Type 2 diabetes mellitus without complication, without long-term 
current use of insulin  E11.9     Active

 

 Problem  Cervicalgia  723.1     Active

 

 Assessment  Epigastric pain  R10.13     Active

 

 Problem  Hepatitis C  B19.20     Active

 

 Problem  Anxiety state, unspecified  300.00     Active



                                                                               
                                                                               
                    



Medications

          





 Medication  Code System  Code  Instructions  Start Date  End Date  Status  
Dosage

 

 Ondansetron HCl  NDC  [address]-65  8 MG Orally every 12 hours prn nausea    
       Take 1 tablet

 

 Oxycodone HCl  NDC  [address]-86  5 mg Orally tiwice daily for 2 weeks then 
once daily then stop  Oct 27, 2016        1 tablet

 

 Amaryl  NDC  [address]-91  2 MG Orally Once a day  Oct 27, 2016        1 
tablet with breakfast or the first main meal of the day



                                                                               
                             



Procedures

          





 Procedure  Coding System  Code  Date

 

 Office Visit, Est Pt., Level 3  CPT-4  20341  Oct 27, 2016



                                                                               
                   



Vital Signs

          





 Date/Time:  Oct 27, 2016

 

 Cardiac Monitoring Heart Rate  72 bpm

 

 Weight  136.0 lbs

 

 Height  67 in

 

 BMI  21.30 Index

 

 Blood Pressure Diastolic  86 mmHg

 

 Blood Pressure Systolic  142 mmHg



                                                                              



Results

          No Known Results                                                     
               



Summary Purpose

          [employer] Submission

## 2022-10-12 NOTE — HISTORY & PHYSICAL-HOSPITALIST
HPI


History of Present Illness:


HPI/Chief Complaint


Mrs. Ricks is a 52-year-old white female who reports a one-week history of 

epigastric pain with occasional radiation to the back.  It is been associated 

with abdominal bloating not.  Roughly 3 days ago she presented to [facility] 

where was noted that her lipase level was elevated and she was admitted for 

pain control.  She had a disagreement with her primary care physician Dr. Smith.  

He reportedly had nothing to do with pain medication but the direction of her 

workup.  She drove directly to our emergency room where she appeared to be in 

acute distress.  She had a CT scan there that did not reveal any significant 

pancreatic abnormalities or biliary tract related pathology.  She reports 

previous sonograms had been unremarkable.  In the past she had undergone 

cholecystectomy but did not reportedly have pancreatitis around her prior to 

the procedure.  She reports 2 years of pain compatible with pancreatitis and at 

least one other hospitalization 6 months ago for pancreatitis.  She reports 

past cocaine usage although it is been several years no other reported illicit 

drug use and only a past history of social alcohol intake.  As I recall she 

stated she had a cocktail several weeks ago but no history of significant 

sustained drinking or binge drinking in the past.  He is not aware of any 

family history of pancreatitis or inflammatory bowel disease.  She's had no 

problems with significant diarrheal illness for constipation in the past.  She 

did exudative yesterday and is had some liquid stool today without melena or 

hematochezia.  She denies night sweats chills or fever.


Date Seen


1/27/18


Time Seen by Provider:  11:00


Attending Physician


Kayleen Daigle MD


PCP


No,Local Physician


Referring Physician





Date of Admission


Jan 27, 2018 at 05:40





Home Medications & Allergies


Home Medications


Reviewed patient Home Medication Reconciliation Form





Allergies





Allergies


Coded Allergies


  No Known Drug Allergies (Unverified1/27/18)








Past Medical-Social-Family Hx


Patient Social History


Alcohol Use:  Past History


Recreational Drug Use:  No (TRIED MARIJUANA X4 IN THE PAST, COCAINE IN THE PAST)


Smoking Status:  Current Everyday Smoker


Type Used:  Cigarettes


2nd Hand Smoke Exposure:  Yes


Physical Abuse Screen:  No


Sexual Abuse:  No


Recent Foreign Travel:  No


Contact w/other who traveled:  No


Recent Hopitalizations:  Yes ([facility])


Recent Infectious Disease Expo:  No





Immunizations Up To Date


Pediatric:  No





Seasonal Allergies


Seasonal Allergies:  No





Surgeries


Yes (HEART CATH)


Gallbladder, Orthopedic, Tubal Ligation





Respiratory


No





Cardiovascular


Yes


Heart Attack, High Cholesterol, Hypertension





Neurological


No





Genitourinary


No


UTI-Chronic





Gastrointestinal


Yes


Gastroesophageal Reflux, Pancreatitis





Musculoskeletal


No (NECK FUSION)





Endocrine


History of Endocrine Disorders:  Yes


Endocrine Disorders:  Hyperthyroidism, Diabetes, Non-Insulin dep





HEENT


History of HEENT Disorders:  Yes


Hearing Impairment:  Hard of Hearing





Cancer


No





Psychosocial


History of Psychiatric Problem:  Yes


Behavioral Health Disorders:  Anxiety, Bipolar, Depression





Integumentary


History of Skin or Integumenta:  Yes (ROSACEA)





Blood Transfusions


History of Blood Disorders:  No





Family Medical History


Family Hx:  


Asthma


  DAUGHTER


FH: lung cancer


  19 MOTHER





Review of Systems


Constitutional:  see HPI


Respiratory:  no symptoms reported, see HPI, No cough, No dyspnea on exertion, 

No hemoptysis, No orthopnea, No phlegm, No short of breath, No stridor, No 

wheezing, No other


Gastrointestinal:  see HPI, other ( epigastric pain and sometimes bores through 

to her back)





Physical Exam


Physical Exam


Vital Signs





Vital Sign - Last 12Hours








 1/27/18





 02:45


 


Temp 98.9


 


Pulse 94


 


Resp 18


 


B/P (MAP) 166/105 (125)


 


Pulse Ox 96


 


O2 Delivery Room Air





Capillary Refill : Less Than 3 Seconds


General Appearance:  WD/WN, Mild Distress


Respiratory:  Chest Non Tender, Lungs Clear, Normal Breath Sounds, No Accessory 

Muscle Use, No Respiratory Distress


Cardiovascular:  Regular Rate, Rhythm, No Edema, No Gallop, No JVD, No Murmur, 

Normal Peripheral Pulses


Gastrointestinal:  Normal Bowel Sounds, No Organomegaly, No Pulsatile Mass, Soft

, Other (gastric pain predominant with guarding no rebound abdomen appears 

mildly distended although is soft bowel sounds positive)





Results


Results/Procedures


Lab


Laboratory Tests


1/27/18 03:35














Assessment/Plan


Admission Diagnosis


1.  Recurrent acute pancreatitis possibly idiopathic but in need of further 

workup.  We'll place of bowel rest will initiate proton pump inhibitor therapy 

considering other GI symptoms with parenteral pain medication as needed.  Upon 

discharge will set up follow up with [facility] with subsequent likely GI 

referral to follow.





Clinical Quality Measures


DVT/VTE Risk/Contraindication:


Risk Factor Score Per Nursing:  3


RFS Level Per Nursing on Admit:  3=High











KAYLEEN DAIGLE MD Jan 27, 2018 12:57 Yes

## 2023-03-30 NOTE — XMS REPORT
[facility]

 Created on: 10/16/2016



Beatrice Ricks

External Reference #: [id number]

: 1965

Sex: Female



Demographics







 [address]  [address]

[place]  [address]

 

 Home Phone  [phone]

 

 Preferred Language  Unknown

 

 Marital Status  Unknown

 

 Sabianist Affiliation  Unknown

 

 Race  White

 

 Ethnic Group  Not  or 





Author







 Author  ANIRUDH HARRIS

 

 Organization  [employer]

 

 Address  Unknown

 

 Phone  Unavailable







Care Team Providers







 Care Team Member Name  Role  Phone

 

 ANIRUDH HARRIS  CP  Unavailable



                                                                



Allergies, Adverse Reactions, Alerts

          





 Substance  Reaction  Event Type

 

 N.K.D.A.  Info Not Available  Non Drug Allergy



                                                                               
         



Problems

          





 Problem Type  Condition  Code  Onset Dates  Condition Status

 

 Assessment  Chronic hepatitis C without hepatic coma  B18.2     Active

 

 Problem  Chronic hepatitis C without hepatic coma  B18.2     Active

 

 Problem  Hepatitis C  B19.20     Active

 

 Problem  Other chronic pancreatitis  K86.1     Active

 

 Assessment  Epigastric pain  R10.13     Active

 

 Assessment  Other chronic pancreatitis  K86.1     Active

 

 Problem  Anxiety state, unspecified  300.00     Active

 

 Problem  Cervicalgia  723.1     Active



                                                                               
                                                                               



Medications

          





 Medication  Code System  Code  Instructions  Start Date  End Date  Status  
Dosage

 

 PredniSONE  NDC  [address]-60  20 mg Orally 2 times a day  Oct 03, 2016      
  1 tablet with food

 

 Percocet  NDC  [address]-71  5-325 MG Orally every 12 hours for one week then 
daily then stop  Oct 11, 2016        1 tablet as needed

 

 Pantoprazole Sodium  NDC  [address]-55  40 mg Orally Once a day  Oct 03, 2016
        1 tablet

 

 Percocet  NDC  [address]-67  5-325 mg    Oct 01, 2016        take 1 tablet by 
oral route every 6 hours as needed

 

 Ondansetron HCl  NDC  [address]-30  8 MG Orally every 12 hours prn nausea    
       Take 1 tablet



                                                                               
                                                 



Procedures

          





 Procedure  Coding System  Code  Date

 

 Office Visit, Est Pt., Level 3  CPT-4  55139  Oct 11, 2016



                                                                               
                   



Vital Signs

          





 Date/Time:  Oct 11, 2016

 

 Cardiac Monitoring Heart Rate  80 bpm

 

 Weight  139 lbs

 

 Height  67 in

 

 BMI  21.77 Index

 

 Blood Pressure Diastolic  100 mmHg

 

 Blood Pressure Systolic  159 mmHg



                                                                              



Results

          No Known Results                                                     
               



Summary Purpose

          eClinicalWorks Submission Patient advised of below, states understanding.